# Patient Record
(demographics unavailable — no encounter records)

---

## 2024-11-09 NOTE — PHYSICAL EXAM
[Well Developed] : well developed [Well Nourished] : well nourished [No Acute Distress] : no acute distress [Normal Conjunctiva] : normal conjunctiva [Normal Venous Pressure] : normal venous pressure [No Carotid Bruit] : no carotid bruit [Normal S1, S2] : normal S1, S2 [No Rub] : no rub [No Gallop] : no gallop [Clear Lung Fields] : clear lung fields [Good Air Entry] : good air entry [No Respiratory Distress] : no respiratory distress  [Soft] : abdomen soft [Non Tender] : non-tender [No Masses/organomegaly] : no masses/organomegaly [Normal Bowel Sounds] : normal bowel sounds [Normal Gait] : normal gait [No Edema] : no edema [No Rash] : no rash [Moves all extremities] : moves all extremities [Alert and Oriented] : alert and oriented [de-identified] : II/VI systolic murmur heard best in the aoritc area

## 2024-11-09 NOTE — HISTORY OF PRESENT ILLNESS
[FreeTextEntry1] : 79 yo F elevated LDL, (Lpa nl), prediabetes, normal Lp(a), Hashimoto's, GERD, s/p Michaelle presents for follow up.  Patient does not have any complains today. She states that she is in good health and felt well in [place] this summer, but has been less active and also eating less healthy for a few months.  No chest pain, SOB, palpitations, nausea, vomiting, PND, or orthopnea reported.  Prior History Do you have polycystic ovarian syndrome? No Have you ever been pregnant? If so, how many times? Yes, 2 Did you have any complications during pregnancy? No  Did you have any postpartum complications? No  Have you had any miscarriages? If so, how many? No Have you gone through menopause? If yes, at what age? Yes, 50  Did you receive hormone replacement? NO  =============================== RADIOLOGY/IMAGING/DIAGNOSTIC TESTING: 3/20/24- Echo- nl LV & RV size & fx, nod TR, mild to mod MR, trace AI, no pericardial effusion  3/20/24- CXR- apical calcified granulomas, pleural thickening LABS:  -May 2023:  March 2024 lipids  HDL 55  TG 59, a1c 5.7%  LIFESTYLE HISTORY:  Mediterranean Diet Score (9 question survey) was 7       (8-9: optimal, 6-7: near-optimal, 4-5: suboptimal, 0-3: markedly suboptimal)  Self-described diet: eats red meat once a week, eats a Mediterranean diet,  Exercise: Patient reports exercising at a slight intensity, >120 minutes per week.  Smoking: never smoker EtOH: does not drink alcohol.  Illicit drug use: none reported.  Stress: Patient denies any stress.  Sleep apnea: [ex. STOPBANG] does not snore at night.   Family Hx: Mom: passed way from leukemia Dad: HTN on meds., arthrosclerosis (carotid plaque), strokes (84s) Kids: healthy ===============================

## 2024-11-09 NOTE — PHYSICAL EXAM
[Well Developed] : well developed [Well Nourished] : well nourished [No Acute Distress] : no acute distress [Normal Conjunctiva] : normal conjunctiva [Normal Venous Pressure] : normal venous pressure [No Carotid Bruit] : no carotid bruit [Normal S1, S2] : normal S1, S2 [No Rub] : no rub [No Gallop] : no gallop [Clear Lung Fields] : clear lung fields [Good Air Entry] : good air entry [No Respiratory Distress] : no respiratory distress  [Soft] : abdomen soft [Non Tender] : non-tender [No Masses/organomegaly] : no masses/organomegaly [Normal Bowel Sounds] : normal bowel sounds [Normal Gait] : normal gait [No Edema] : no edema [No Rash] : no rash [Moves all extremities] : moves all extremities [Alert and Oriented] : alert and oriented [de-identified] : II/VI systolic murmur heard best in the aoritc area

## 2024-11-09 NOTE — DISCUSSION/SUMMARY
[FreeTextEntry1] : 79 yo F Hashimoto's, GERD, s/p Michaelle, here for ASCVD risk optimization.   #ASCVD risk optimization #HLD #ASCVD Risk reduction LDL now at goal  - c/w rosuvastatin 10mg daily - encouraged patient to continue healthy exercise and eating habits, focusing on a mostly plant based diet, Mediterranean style of eating and aiming for the recommended 150 minutes per week of moderate physical activity. Encouraged some degree of weight loss for all her risk factors.   #Elevated BP reading - BP's improved on visit today.  - advised patient to continue to watch salt in diet and limit to use of meds like NSAIDs  #[place]umur on exam - mod TR, mild-mod TR, trace AI- repeat echo March 2024.   Follow up with Dr Ahn 6 months in person. Will then do telehealth 6 months after that and return yearly in person for echo and visit.

## 2024-11-09 NOTE — REASON FOR VISIT
[CV Risk Factors and Non-Cardiac Disease] : CV risk factors and non-cardiac disease [Structural Heart and Valve Disease] : structural heart and valve disease [FreeTextEntry3] : Dr Eva Garduno

## 2025-04-17 NOTE — HISTORY OF PRESENT ILLNESS
[FreeTextEntry1] : 78 yo Kuwaiti speaking female HLDL, (Lpa nl), prediabetes, Hashimoto's, GERD, s/p Hodgins presents for follow up.  Patient does not have any complains today. Feels well at this time. Reports adherence with medications.  No chest pain, SOB, palpitations, nausea, vomiting, PND, or orthopnea reported. ================================= Prior History Do you have polycystic ovarian syndrome? No Have you ever been pregnant? If so, how many times? Yes, 2 Did you have any complications during pregnancy? No  Did you have any postpartum complications? No  Have you had any miscarriages? If so, how many? No Have you gone through menopause? If yes, at what age? Yes, 50  Did you receive hormone replacement? NO   LIFESTYLE HISTORY:  Mediterranean Diet Score (9 question survey) was 7       (8-9: optimal, 6-7: near-optimal, 4-5: suboptimal, 0-3: markedly suboptimal)  Self-described diet: eats red meat once a week, eats a Mediterranean diet,  Exercise: Patient reports exercising at a slight intensity, >120 minutes per week.  Smoking: never smoker EtOH: does not drink alcohol.  Illicit drug use: none reported.  Stress: Patient denies any stress.  Sleep apnea: [ex. STOPBANG] does not snore at night.   Family Hx: Mom: passed way from leukemia Dad: HTN on meds., arthrosclerosis (carotid plaque), strokes (84s) Kids: healthy =============================== RADIOLOGY/IMAGING/DIAGNOSTIC TESTING: 3/20/24- Echo- nl LV & RV size & fx, mod TR, mild to mod MR, trace AI, no pericardial effusion  3/20/24- CXR- apical calcified granulomas, pleural thickening  LABS: Nov 2024  HDL 55 LDL 60 TG 86; a1c 5.6%  -May 2023:  March 2024 lipids  HDL 55  TG 59, a1c 5.7%

## 2025-04-17 NOTE — DISCUSSION/SUMMARY
[FreeTextEntry1] : 80 yo Spanish speaking female HLDL, (Lpa nl), prediabetes, Hashimoto's, GERD, s/p Hodgins presents for follow up.  #HLD #ASCVD Risk reduction LDL at goal <100 - c/w rosuvastatin 10mg daily - encouraged patient to continue healthy exercise and eating habits, focusing on a mostly plant based diet, Mediterranean style of eating and aiming for the recommended 150 minutes per week of moderate physical activity. Encouraged some degree of weight loss for all her risk factors.   #Elevated BP reading BP improved on recheck today.  - advised patient to continue to watch salt in diet and limit to use of meds like NSAIDs  #Mod TR #Mild to mod MR #Mild AI - repeat TTE today  Follow up with Dr Ahn 12 months in person [EKG obtained to assist in diagnosis and management of assessed problem(s)] : EKG obtained to assist in diagnosis and management of assessed problem(s)

## 2025-04-17 NOTE — DISCUSSION/SUMMARY
[FreeTextEntry1] : 78 yo Argentine speaking female HLDL, (Lpa nl), prediabetes, Hashimoto's, GERD, s/p Hodgins presents for follow up.  #HLD #ASCVD Risk reduction LDL at goal <100 - c/w rosuvastatin 10mg daily - encouraged patient to continue healthy exercise and eating habits, focusing on a mostly plant based diet, Mediterranean style of eating and aiming for the recommended 150 minutes per week of moderate physical activity. Encouraged some degree of weight loss for all her risk factors.   #Elevated BP reading BP improved on recheck today.  - advised patient to continue to watch salt in diet and limit to use of meds like NSAIDs  #Mod TR #Mild to mod MR #Mild AI - repeat TTE today  Follow up with Dr Ahn 12 months in person [EKG obtained to assist in diagnosis and management of assessed problem(s)] : EKG obtained to assist in diagnosis and management of assessed problem(s)

## 2025-04-17 NOTE — PHYSICAL EXAM
[Well Developed] : well developed [Well Nourished] : well nourished [No Acute Distress] : no acute distress [Normal Conjunctiva] : normal conjunctiva [Normal Venous Pressure] : normal venous pressure [No Carotid Bruit] : no carotid bruit [Normal S1, S2] : normal S1, S2 [No Rub] : no rub [No Gallop] : no gallop [Clear Lung Fields] : clear lung fields [Good Air Entry] : good air entry [No Respiratory Distress] : no respiratory distress  [Soft] : abdomen soft [Non Tender] : non-tender [No Masses/organomegaly] : no masses/organomegaly [Normal Bowel Sounds] : normal bowel sounds [Normal Gait] : normal gait [No Edema] : no edema [No Rash] : no rash [Moves all extremities] : moves all extremities [Alert and Oriented] : alert and oriented [de-identified] : II/VI systolic murmur heard best in the aoritc area

## 2025-04-17 NOTE — PHYSICAL EXAM
[Well Developed] : well developed [Well Nourished] : well nourished [No Acute Distress] : no acute distress [Normal Conjunctiva] : normal conjunctiva [Normal Venous Pressure] : normal venous pressure [No Carotid Bruit] : no carotid bruit [Normal S1, S2] : normal S1, S2 [No Rub] : no rub [No Gallop] : no gallop [Clear Lung Fields] : clear lung fields [Good Air Entry] : good air entry [No Respiratory Distress] : no respiratory distress  [Soft] : abdomen soft [Non Tender] : non-tender [No Masses/organomegaly] : no masses/organomegaly [Normal Bowel Sounds] : normal bowel sounds [Normal Gait] : normal gait [No Edema] : no edema [No Rash] : no rash [Moves all extremities] : moves all extremities [Alert and Oriented] : alert and oriented [de-identified] : II/VI systolic murmur heard best in the aoritc area

## 2025-04-17 NOTE — HISTORY OF PRESENT ILLNESS
[FreeTextEntry1] : 78 yo Bangladeshi speaking female HLDL, (Lpa nl), prediabetes, Hashimoto's, GERD, s/p Hodgins presents for follow up.  Patient does not have any complains today. Feels well at this time. Reports adherence with medications.  No chest pain, SOB, palpitations, nausea, vomiting, PND, or orthopnea reported. ================================= Prior History Do you have polycystic ovarian syndrome? No Have you ever been pregnant? If so, how many times? Yes, 2 Did you have any complications during pregnancy? No  Did you have any postpartum complications? No  Have you had any miscarriages? If so, how many? No Have you gone through menopause? If yes, at what age? Yes, 50  Did you receive hormone replacement? NO   LIFESTYLE HISTORY:  Mediterranean Diet Score (9 question survey) was 7       (8-9: optimal, 6-7: near-optimal, 4-5: suboptimal, 0-3: markedly suboptimal)  Self-described diet: eats red meat once a week, eats a Mediterranean diet,  Exercise: Patient reports exercising at a slight intensity, >120 minutes per week.  Smoking: never smoker EtOH: does not drink alcohol.  Illicit drug use: none reported.  Stress: Patient denies any stress.  Sleep apnea: [ex. STOPBANG] does not snore at night.   Family Hx: Mom: passed way from leukemia Dad: HTN on meds., arthrosclerosis (carotid plaque), strokes (84s) Kids: healthy =============================== RADIOLOGY/IMAGING/DIAGNOSTIC TESTING: 3/20/24- Echo- nl LV & RV size & fx, mod TR, mild to mod MR, trace AI, no pericardial effusion  3/20/24- CXR- apical calcified granulomas, pleural thickening  LABS: Nov 2024  HDL 55 LDL 60 TG 86; a1c 5.6%  -May 2023:  March 2024 lipids  HDL 55  TG 59, a1c 5.7%